# Patient Record
Sex: FEMALE | Race: BLACK OR AFRICAN AMERICAN | NOT HISPANIC OR LATINO | URBAN - METROPOLITAN AREA
[De-identification: names, ages, dates, MRNs, and addresses within clinical notes are randomized per-mention and may not be internally consistent; named-entity substitution may affect disease eponyms.]

---

## 2019-09-09 ENCOUNTER — APPOINTMENT (RX ONLY)
Dept: URBAN - METROPOLITAN AREA CLINIC 111 | Facility: CLINIC | Age: 49
Setting detail: DERMATOLOGY
End: 2019-09-09

## 2019-09-09 DIAGNOSIS — L20.89 OTHER ATOPIC DERMATITIS: ICD-10-CM

## 2019-09-09 PROBLEM — L20.84 INTRINSIC (ALLERGIC) ECZEMA: Status: ACTIVE | Noted: 2019-09-09

## 2019-09-09 PROBLEM — I10 ESSENTIAL (PRIMARY) HYPERTENSION: Status: ACTIVE | Noted: 2019-09-09

## 2019-09-09 PROBLEM — F41.9 ANXIETY DISORDER, UNSPECIFIED: Status: ACTIVE | Noted: 2019-09-09

## 2019-09-09 PROCEDURE — ? DUPIXENT INITIATION

## 2019-09-09 PROCEDURE — ? TREATMENT REGIMEN

## 2019-09-09 PROCEDURE — ? DIAGNOSIS COMMENT

## 2019-09-09 PROCEDURE — ? COUNSELING

## 2019-09-09 PROCEDURE — 99202 OFFICE O/P NEW SF 15 MIN: CPT

## 2019-09-09 PROCEDURE — ? PRESCRIPTION

## 2019-09-09 RX ORDER — CRISABOROLE 20 MG/G
OINTMENT TOPICAL
Qty: 1 | Refills: 2 | Status: ERX | COMMUNITY
Start: 2019-09-09

## 2019-09-09 RX ORDER — MOMETASONE FUROATE 1 MG/G
OINTMENT TOPICAL TWICE DAILY
Qty: 1 | Refills: 1 | Status: ERX | COMMUNITY
Start: 2019-09-09

## 2019-09-09 RX ADMIN — CRISABOROLE: 20 OINTMENT TOPICAL at 20:19

## 2019-09-09 RX ADMIN — MOMETASONE FUROATE: 1 OINTMENT TOPICAL at 20:18

## 2019-09-09 ASSESSMENT — SEVERITY ASSESSMENT: SEVERITY: MODERATE TO SEVERE

## 2019-09-09 ASSESSMENT — LOCATION DETAILED DESCRIPTION DERM
LOCATION DETAILED: LEFT ULNAR DORSAL HAND
LOCATION DETAILED: RIGHT VENTRAL DISTAL FOREARM
LOCATION DETAILED: LEFT VENTRAL DISTAL FOREARM
LOCATION DETAILED: LEFT DORSAL FOOT
LOCATION DETAILED: RIGHT RADIAL DORSAL HAND
LOCATION DETAILED: RIGHT ANKLE
LOCATION DETAILED: LEFT DISTAL PRETIBIAL REGION
LOCATION DETAILED: RIGHT PROXIMAL PRETIBIAL REGION

## 2019-09-09 ASSESSMENT — LOCATION SIMPLE DESCRIPTION DERM
LOCATION SIMPLE: LEFT FOREARM
LOCATION SIMPLE: LEFT HAND
LOCATION SIMPLE: RIGHT FOREARM
LOCATION SIMPLE: RIGHT ANKLE
LOCATION SIMPLE: LEFT PRETIBIAL REGION
LOCATION SIMPLE: RIGHT HAND
LOCATION SIMPLE: RIGHT PRETIBIAL REGION
LOCATION SIMPLE: LEFT FOOT

## 2019-09-09 ASSESSMENT — LOCATION ZONE DERM
LOCATION ZONE: LEG
LOCATION ZONE: HAND
LOCATION ZONE: ARM
LOCATION ZONE: FEET

## 2019-09-09 NOTE — PROCEDURE: TREATMENT REGIMEN
Initiate Treatment: Dupixent - initial dose: 600mg/4mL (2 injections subcutaneously on Day 1), maintenance dose: 300mg/mL (1 injection every 2 weeks starting on Day 15) for 3 months.\\nOTC Aveeno Eczema [place] - Apply to affected area of hands, legs, and ankles qd-bid \\nEucrisa 2% ointment - apply to affected areas of hands, legs, and ankles qd-bid. Samples provided to patient today. \\nMometasone 0.1% ointment - Apply to affected areas twice daily 3 weeks on, 1 week off, repeat as needed for flares. Avoid face, groin, and armpits.
Detail Level: Zone
Plan: Narrowband UVB on reserve

## 2019-09-09 NOTE — PROCEDURE: DIAGNOSIS COMMENT
Comment: Patient instructed to schedule appointment 3 months after starting Dupixent injections.\\n\\nPatient has previously tried Lidex 0.05% ointment, triamcinolone 0.1% lotion, cream, and ointment, Halobetasol  0.05% ointment, tacrolimus 0.1% ointment, and Eucrisa all of which have been ineffective.\\n\\nPatient currently flaring with severe, chronic, painful plaques distributed on her b/l hands, legs, and ankles today.
Detail Level: Simple

## 2019-09-09 NOTE — HPI: RASH (ECZEMA)
How Severe Is Your Eczema?: mild
Is This A New Presentation, Or A Follow-Up?: Rash
Additional History: Patient  was previously diagnosed with eczema and has had no flare ups in years. This episode started to flare up about 1.5 months ago. She has been treating her rash with her daughters prescription of Triamcinolone ointment twice a day for 3-4 week with no improvement.